# Patient Record
(demographics unavailable — no encounter records)

---

## 2025-02-28 NOTE — ADDENDUM
[FreeTextEntry1] : Documented by Latoya Maldonado acting as a scribe for Dr. Griffin. 02/28/2025   All medical record entries made by the scribe were at my, Dr. Griffin, direction and personally dictated by me on 02/28/2025. I have reviewed the chart an agree that the record accurately reflects my personal performance of the history, physical exam, assessment and plan. I have also personally directed, reviewed, and agreed with the chart.

## 2025-02-28 NOTE — PLAN
[FreeTextEntry1] : Follow up  HLD  we'll check lipids and LFT's  if still elevated we'll start medication  follow with cardiology   DM II cont. glipizide 5mg BID -- renewed today cont. Jardiance 10 mg QD -- renewed today will monitor HbA1C  Low D cont D 1000 u/d  Bloodwork ordered. Follow up in 3 months.

## 2025-02-28 NOTE — HISTORY OF PRESENT ILLNESS
[de-identified] : Mr. NESTOR EVANGELISTA is a 64 year old male with Hx of DM II, decrease hearing, and HLD, presenting for a follow up on chronic problems and Rx refills.   Pt states he is feeling well. Offers no complaints. Pt is travelling to [facility] and needs Rx refills.  He reports compliance with taking his meds and states he is moderately active.  Pt's cholesterol/TG/LDL on August labs were elevated and is not currently on medication for HLD.  Denies any SOB, CP, abdominal pain, N/V/D, headache, dizziness, or leg swelling.